# Patient Record
Sex: MALE | Race: WHITE | NOT HISPANIC OR LATINO | Employment: FULL TIME | URBAN - NONMETROPOLITAN AREA
[De-identification: names, ages, dates, MRNs, and addresses within clinical notes are randomized per-mention and may not be internally consistent; named-entity substitution may affect disease eponyms.]

---

## 2021-02-12 ENCOUNTER — TRANSCRIBE ORDERS (OUTPATIENT)
Dept: ADMINISTRATIVE | Facility: HOSPITAL | Age: 26
End: 2021-02-12

## 2021-02-12 DIAGNOSIS — R10.9 ABDOMINAL PAIN, UNSPECIFIED ABDOMINAL LOCATION: Primary | ICD-10-CM

## 2021-02-12 DIAGNOSIS — N50.82 SCROTAL PAIN: ICD-10-CM

## 2021-03-11 ENCOUNTER — APPOINTMENT (OUTPATIENT)
Dept: ULTRASOUND IMAGING | Facility: HOSPITAL | Age: 26
End: 2021-03-11

## 2021-03-11 ENCOUNTER — HOSPITAL ENCOUNTER (OUTPATIENT)
Dept: ULTRASOUND IMAGING | Facility: HOSPITAL | Age: 26
Discharge: HOME | End: 2021-03-11
Admitting: INTERNAL MEDICINE

## 2021-03-11 DIAGNOSIS — N50.82 SCROTAL PAIN: ICD-10-CM

## 2021-03-11 PROCEDURE — 76870 US EXAM SCROTUM: CPT

## 2021-04-22 ENCOUNTER — OFFICE VISIT (OUTPATIENT)
Dept: UROLOGY | Facility: CLINIC | Age: 26
End: 2021-04-22

## 2021-04-22 VITALS
BODY MASS INDEX: 29.02 KG/M2 | DIASTOLIC BLOOD PRESSURE: 70 MMHG | HEART RATE: 70 BPM | OXYGEN SATURATION: 98 % | HEIGHT: 64 IN | WEIGHT: 170 LBS | TEMPERATURE: 97.2 F | RESPIRATION RATE: 16 BRPM | SYSTOLIC BLOOD PRESSURE: 130 MMHG

## 2021-04-22 DIAGNOSIS — N50.82 SCROTAL PAIN: Primary | ICD-10-CM

## 2021-04-22 PROCEDURE — 99203 OFFICE O/P NEW LOW 30 MIN: CPT | Performed by: UROLOGY

## 2021-04-22 RX ORDER — PROPRANOLOL HCL 60 MG
CAPSULE, EXTENDED RELEASE 24HR ORAL
COMMUNITY
End: 2022-03-18

## 2021-04-22 RX ORDER — GUSELKUMAB 100 MG/ML
INJECTION SUBCUTANEOUS
COMMUNITY
Start: 2021-01-13 | End: 2022-03-18

## 2021-04-22 RX ORDER — MELOXICAM 15 MG/1
15 TABLET ORAL DAILY
COMMUNITY
Start: 2021-02-24 | End: 2022-03-18

## 2021-04-22 NOTE — PROGRESS NOTES
Chief Complaint  Chief Complaint   Patient presents with   • Scrotal Pain      Referring Provider:  Anjelica Celis MD    HPI  Mr. Rodriguez is a 26 y.o. male with below past medical history who presents with scrotal pain. He notes that the pain started about 2 years ago, but for a while he did not feel it. His pain returned recently. He describes it as dull unless he puts pressure on his scrotum, and then it is a sharp pain. Activity occasionally makes it worse. The pain is not radiating. The patient denies any burning, pain or blood with urination. He notes tightness following intercourse.    The patient notes that he went to another physician about 6 months ago who told him that it may be epididymitis, but he wasn't sure if she was just guessing. She prescribed some antibiotics that did not help.    Past Medical History  History reviewed. No pertinent past medical history.    Past Surgical History  History reviewed. No pertinent surgical history.    Medications    Current Outpatient Medications:   •  meloxicam (MOBIC) 15 MG tablet, Take 15 mg by mouth Daily., Disp: , Rfl:   •  propranolol LA (INDERAL LA) 60 MG 24 hr capsule, propranolol ER 60 mg capsule,24 hr,extended release  TAKE 1 CAPSULE BY MOUTH EVERY DAY AT BEDTIME, Disp: , Rfl:   •  Tremfya 100 MG/ML solution pen-injector, INJECT 100MG UNDER THE SKIN AT WEEK 0, Disp: , Rfl:     Allergies  No Known Allergies    Social History  Social History     Socioeconomic History   • Marital status:      Spouse name: Not on file   • Number of children: Not on file   • Years of education: Not on file   • Highest education level: Not on file   Tobacco Use   • Smoking status: Never Smoker   • Smokeless tobacco: Never Used   Substance and Sexual Activity   • Alcohol use: Not Currently   • Drug use: Never   • Sexual activity: Defer       Family History  History reviewed. No pertinent family history.    Review of Systems  Review of systems was notable for  "post-ejaculatory pain.    Physical Exam  Visit Vitals  /70   Pulse 70   Temp 97.2 °F (36.2 °C)   Resp 16   Ht 162.6 cm (64\")   Wt 77.1 kg (170 lb)   SpO2 98%   BMI 29.18 kg/m²     Physical exam was performed and was notable for right, very tight and painful levator tenderness with palpation.    Genitourinary  Penis: uncircumcised penis, glans normal, meatus patent, no penile discharge.  No rashes/lesions.    Testes: descended bilaterally, no masses, non-tender to palpation, right is slightly tender to palpation. Remainder of scrotal contents normal. No hernia appreciated.      Labs  No results found for: PSA    No results found for: GLUCOSE, CALCIUM, NA, K, CO2, CL, BUN, CREATININE, EGFRIFAFRI, EGFRIFNONA, BCR, ANIONGAP    No results found for: WBC, HGB, HCT, MCV, PLT    Brief Urine Lab Results     None           Radiologic Studies  US Scrotum & Testicles  Result Date: 3/11/2021  No evidence of testicular mass or torsion.  This report was finalized on 3/11/2021 8:16 AM by Vandana Suarez M.D..      Assessment  Mr. Rodriguez is a 26 y.o. male who presents with testicular pain. I recommended that the patient attend pelvic floor physical therapy at [facility] to help with his symptoms. I also recommended tighter underwear or a jock strap, as well as warm soaks and scheduled ibuprofen 3 times a day for a week for a flare up. There is nothing organically wrong with his testicles that I am able to find on physical exam or imaging.     Plan  1.  Referral for pelvic floor physical therapy at [facility].  2.  Scheduled ibuprofen 3 times a day for flare ups.   3.  Follow up in 6 months.     Scribed for Josh Bustillos MD by Aris Matias.  04/22/21   14:09 EDT    I have personally performed the services described in this document as scribed by the above individual, and it is both accurate and complete.  Josh Bustillos MD  4/26/2021  21:21 EDT      "

## 2022-03-18 ENCOUNTER — OFFICE VISIT (OUTPATIENT)
Dept: INTERNAL MEDICINE | Facility: CLINIC | Age: 27
End: 2022-03-18

## 2022-03-18 VITALS
HEART RATE: 89 BPM | SYSTOLIC BLOOD PRESSURE: 122 MMHG | TEMPERATURE: 98.4 F | DIASTOLIC BLOOD PRESSURE: 70 MMHG | WEIGHT: 165.12 LBS | HEIGHT: 65 IN | BODY MASS INDEX: 27.51 KG/M2 | OXYGEN SATURATION: 99 %

## 2022-03-18 DIAGNOSIS — Z00.00 ROUTINE GENERAL MEDICAL EXAMINATION AT A HEALTH CARE FACILITY: ICD-10-CM

## 2022-03-18 DIAGNOSIS — L29.0 RECTAL ITCHING: Primary | ICD-10-CM

## 2022-03-18 DIAGNOSIS — E78.2 MIXED HYPERLIPIDEMIA: ICD-10-CM

## 2022-03-18 PROCEDURE — 99385 PREV VISIT NEW AGE 18-39: CPT | Performed by: INTERNAL MEDICINE

## 2022-03-18 NOTE — PROGRESS NOTES
Chief Complaint   Patient presents with   • Hernia     New patient - hernia concerns (pain with lifting weights). Testicle pain / sensitive. Skin concerns (rectal)Trouble falling asleep. Fasting today for labs, elevated cholesterol and liver enzymes on last labs (over a year ago)       CAILIN Irziarry is a 27 y.o. male and is here for a comprehensive physical exam. The patient reports problems - rectal itching.     History:  Erectile dysfunction  no  Nocturia  no      Do you take any herbs or supplements that were not prescribed by a doctor? no    Are you taking aspirin daily? no      Health Habits:  Dental Exam. unknown  Eye Exam. up to date  Exercise: 4 times/week.  Current exercise activities include: cardiovascular workout on exercise equipment and weightlifting    Health Maintenance   Topic Date Due   • ANNUAL PHYSICAL  Never done   • TDAP/TD VACCINES (1 - Tdap) Never done   • HEPATITIS C SCREENING  Never done   • COVID-19 Vaccine (3 - Booster for Moderna series) 09/27/2021   • LIPID PANEL  Never done   • INFLUENZA VACCINE  03/31/2022 (Originally 8/1/2021)   • Pneumococcal Vaccine 0-64  Aged Out       PMH, PSH, SocHx, FamHx, Allergies, and Medications: Reviewed and updated in the Visit Navigator.     No Known Allergies  Past Medical History:   Diagnosis Date   • Elevated liver enzymes    • Psoriasis      No past surgical history on file.  Social History     Socioeconomic History   • Marital status:    Tobacco Use   • Smoking status: Never Smoker   • Smokeless tobacco: Never Used   Vaping Use   • Vaping Use: Never used   Substance and Sexual Activity   • Alcohol use: Not Currently     Comment: Social   • Drug use: Never   • Sexual activity: Defer     Family History   Problem Relation Age of Onset   • No Known Problems Mother    • No Known Problems Father    • Cancer Maternal Grandfather        Review of Systems  Review of Systems   Constitutional: Negative.  Negative for activity change, appetite  "change, fatigue and fever.   HENT: Negative for congestion, ear discharge, ear pain and trouble swallowing.    Eyes: Negative for photophobia and visual disturbance.   Respiratory: Negative for cough and shortness of breath.    Cardiovascular: Negative for chest pain and palpitations.   Gastrointestinal: Negative for abdominal distention, abdominal pain, constipation, diarrhea, nausea and vomiting.   Endocrine: Negative.    Genitourinary: Negative for dysuria, hematuria and urgency.   Musculoskeletal: Negative for arthralgias, back pain, joint swelling and myalgias.   Skin: Negative for color change and rash.   Allergic/Immunologic: Negative.    Neurological: Negative for dizziness, weakness, light-headedness and headaches.   Hematological: Negative for adenopathy. Does not bruise/bleed easily.   Psychiatric/Behavioral: Positive for sleep disturbance. Negative for agitation, confusion and dysphoric mood. The patient is not nervous/anxious.        Vitals:    03/18/22 0948   BP: 122/70   Pulse: 89   Temp: 98.4 °F (36.9 °C)   SpO2: 99%       Objective   /70   Pulse 89   Temp 98.4 °F (36.9 °C) (Infrared)   Ht 165.1 cm (65\")   Wt 74.9 kg (165 lb 1.9 oz)   SpO2 99%   BMI 27.48 kg/m²     Physical Exam  Constitutional:       General: He is not in acute distress.     Appearance: He is well-developed.   HENT:      Nose: Nose normal.   Eyes:      General: No scleral icterus.     Conjunctiva/sclera: Conjunctivae normal.   Neck:      Thyroid: No thyromegaly.      Trachea: No tracheal deviation.   Cardiovascular:      Rate and Rhythm: Normal rate and regular rhythm.      Heart sounds: No murmur heard.    No friction rub.   Pulmonary:      Effort: No respiratory distress.      Breath sounds: No wheezing or rales.   Abdominal:      General: There is no distension.      Palpations: Abdomen is soft. There is no mass.      Tenderness: There is no abdominal tenderness. There is no guarding.   Genitourinary:     Rectum: " Normal.   Musculoskeletal:         General: No deformity. Normal range of motion.   Lymphadenopathy:      Cervical: No cervical adenopathy.   Skin:     General: Skin is warm and dry.      Findings: No erythema or rash.   Neurological:      Mental Status: He is alert and oriented to person, place, and time.      Cranial Nerves: No cranial nerve deficit.      Coordination: Coordination normal.      Deep Tendon Reflexes: Reflexes are normal and symmetric.   Psychiatric:         Behavior: Behavior normal.         Thought Content: Thought content normal.         Judgment: Judgment normal.         The CVD Risk score (SIRENA'Agostino, et al., 2008) failed to calculate for the following reasons:    The 2008 CVD risk score is only valid for ages 30 to 74    No results found for: CHOL, CHLPL, TRIG, HDL, LDL, LDLDIRECT  No results found for: GLUCOSE, BUN, CREATININE, NA, K, CL, CO2, CALCIUM, PROTEINTOT, ALBUMIN, ALT, AST, ALKPHOS, BILITOT, EGFRIFNONA, LABIL2, BCR, ANIONGAP  No results found for: WBC, HGB, HCT, MCV, PLT    Assessment/Plan   1. Healthy male exam.   2. Patient Counseling: Including but not Limited to the following, when appropriate:  --Nutrition: Stressed importance of moderation in sodium/caffeine intake, saturated fat and cholesterol, caloric balance, sufficient intake of fresh fruits, vegetables, fiber    --Exercise: Stressed the importance of regular exercise.   --Substance Abuse: Discussed cessation/primary prevention of tobacco, alcohol, or other drug use; driving or other dangerous activities under the influence; availability of treatment for abuse, as indicated based on social history.    --Sexuality: Discussed sexually transmitted diseases, partner selection, use of condoms and contraceptive alternatives.   --Injury prevention: Discussed safety belts, safety helmets, smoke detector, smoking near bedding or upholstery.   --Dental health: Discussed importance of regular tooth brushing, flossing, and dental  visits.  --Immunizations reviewed.        3. Discussed the patient's BMI with him.  The BMI is in the acceptable range  4. No follow-ups on file.  5. Age-appropriate Screening Scheduled  6. There are no Patient Instructions on file for this visit.    Assessment/Plan     Diagnoses and all orders for this visit:    1. Rectal itching (Primary) exam unremarkable.  Trial of Preparation H denies constipation    2. Routine general medical examination at a health care facility  -     Lipid Panel  -     Comprehensive Metabolic Panel  -     Vitamin D 25 Hydroxy  -     CBC (No Diff)    3. Mixed hyperlipidemia noted to have elevated lipids in the past discussed diet check labs

## 2022-03-19 LAB
25(OH)D3+25(OH)D2 SERPL-MCNC: 8.1 NG/ML (ref 30–100)
ALBUMIN SERPL-MCNC: 5 G/DL (ref 3.5–5.2)
ALBUMIN/GLOB SERPL: 2.1 G/DL
ALP SERPL-CCNC: 85 U/L (ref 39–117)
ALT SERPL-CCNC: 64 U/L (ref 1–41)
AST SERPL-CCNC: 24 U/L (ref 1–40)
BILIRUB SERPL-MCNC: 0.3 MG/DL (ref 0–1.2)
BUN SERPL-MCNC: 12 MG/DL (ref 6–20)
BUN/CREAT SERPL: 13.5 (ref 7–25)
CALCIUM SERPL-MCNC: 9.5 MG/DL (ref 8.6–10.5)
CHLORIDE SERPL-SCNC: 104 MMOL/L (ref 98–107)
CHOLEST SERPL-MCNC: 252 MG/DL (ref 0–200)
CO2 SERPL-SCNC: 24.8 MMOL/L (ref 22–29)
CREAT SERPL-MCNC: 0.89 MG/DL (ref 0.76–1.27)
EGFRCR SERPLBLD CKD-EPI 2021: 121.2 ML/MIN/1.73
ERYTHROCYTE [DISTWIDTH] IN BLOOD BY AUTOMATED COUNT: 12.4 % (ref 12.3–15.4)
GLOBULIN SER CALC-MCNC: 2.4 GM/DL
GLUCOSE SERPL-MCNC: 98 MG/DL (ref 65–99)
HCT VFR BLD AUTO: 45.3 % (ref 37.5–51)
HCV AB S/CO SERPL IA: 0.1 S/CO RATIO (ref 0–0.9)
HDLC SERPL-MCNC: 40 MG/DL (ref 40–60)
HGB BLD-MCNC: 15.1 G/DL (ref 13–17.7)
LDLC SERPL CALC-MCNC: 185 MG/DL (ref 0–100)
MCH RBC QN AUTO: 30.3 PG (ref 26.6–33)
MCHC RBC AUTO-ENTMCNC: 33.3 G/DL (ref 31.5–35.7)
MCV RBC AUTO: 91 FL (ref 79–97)
PLATELET # BLD AUTO: 320 10*3/MM3 (ref 140–450)
POTASSIUM SERPL-SCNC: 4.9 MMOL/L (ref 3.5–5.2)
PROT SERPL-MCNC: 7.4 G/DL (ref 6–8.5)
RBC # BLD AUTO: 4.98 10*6/MM3 (ref 4.14–5.8)
SODIUM SERPL-SCNC: 141 MMOL/L (ref 136–145)
TRIGL SERPL-MCNC: 144 MG/DL (ref 0–150)
VLDLC SERPL CALC-MCNC: 27 MG/DL (ref 5–40)
WBC # BLD AUTO: 6.73 10*3/MM3 (ref 3.4–10.8)

## 2022-03-22 ENCOUNTER — TELEPHONE (OUTPATIENT)
Dept: INTERNAL MEDICINE | Facility: CLINIC | Age: 27
End: 2022-03-22

## 2022-03-22 NOTE — TELEPHONE ENCOUNTER
Called and informed patient that I do not see that anyone tried contacting him. Informed patient I do see that labs are back but Dr. Lorenz is out of the office until Thursday.

## 2022-03-22 NOTE — TELEPHONE ENCOUNTER
Patient called and states he was returning a call to get his lab results. He had a missed call yesterday and would like a call back today if possible. Call him at [phone]

## 2022-03-24 NOTE — TELEPHONE ENCOUNTER
I was not able to get hold of him on 3 different occasions.  Could not leave a message on his cell phone.  Left message now and discussed with wife advised to start vitamin D3 2000 units daily with food.  Discussed dietary restrictions.

## 2022-12-09 ENCOUNTER — HOSPITAL ENCOUNTER (OUTPATIENT)
Dept: GENERAL RADIOLOGY | Facility: HOSPITAL | Age: 27
Discharge: HOME | End: 2022-12-09
Admitting: NURSE PRACTITIONER

## 2022-12-09 ENCOUNTER — OFFICE VISIT (OUTPATIENT)
Dept: INTERNAL MEDICINE | Facility: CLINIC | Age: 27
End: 2022-12-09

## 2022-12-09 VITALS
TEMPERATURE: 97.7 F | HEIGHT: 65 IN | OXYGEN SATURATION: 100 % | HEART RATE: 77 BPM | DIASTOLIC BLOOD PRESSURE: 88 MMHG | BODY MASS INDEX: 29.16 KG/M2 | SYSTOLIC BLOOD PRESSURE: 128 MMHG | WEIGHT: 175 LBS

## 2022-12-09 DIAGNOSIS — M79.89 SWELLING OF RIGHT MIDDLE FINGER: Primary | ICD-10-CM

## 2022-12-09 DIAGNOSIS — M79.644 PAIN OF RIGHT MIDDLE FINGER: ICD-10-CM

## 2022-12-09 PROBLEM — L40.9 PSORIASIS: Status: ACTIVE | Noted: 2022-12-09

## 2022-12-09 PROCEDURE — 73140 X-RAY EXAM OF FINGER(S): CPT

## 2022-12-09 PROCEDURE — 99213 OFFICE O/P EST LOW 20 MIN: CPT | Performed by: NURSE PRACTITIONER

## 2022-12-09 RX ORDER — IBUPROFEN 800 MG/1
800 TABLET ORAL 2 TIMES DAILY PRN
Qty: 28 TABLET | Refills: 0 | Status: SHIPPED | OUTPATIENT
Start: 2022-12-09

## 2022-12-09 NOTE — PROGRESS NOTES
"  Office Visit      Patient Name: CAILIN Irizarry  : 1995   MRN: [id number]   Care Team: Patient Care Team:  Bryan Lorenz MD as PCP - General (Internal Medicine)    Chief Complaint  Finger Injury (Right hand middle finger)    Subjective     Subjective      CAILIN Irizarry presents to [facility] for right middle finger swelling.   Symptoms started about 1.5 weeks ago.   He thinks he may have hurt himself on a weighted plate but he is really unsure if this is what caused the swelling.   Endorses erythema, swelling of the middle finger, and mild pain.   Denies warmth, bilateral symptoms, ETOH use, history of gout, fever, and pruritis.  Ha improved some, symptoms are somewhat waxing and waning. Has noticed   He has tried ice but didn't do much   History of psoriasis, has not tried anything for the symptoms.   Objective     Objective   Vital Signs:   /88   Pulse 77   Temp 97.7 °F (36.5 °C)   Ht 165.1 cm (65\")   Wt 79.4 kg (175 lb)   SpO2 100%   BMI 29.12 kg/m²     Physical Exam  Vitals and nursing note reviewed.   Constitutional:       Appearance: Normal appearance.   Musculoskeletal:         General: Normal range of motion.      Comments: Soft tissue swelling with mild erythema or right middle finger- ROM intact     Skin:     General: Skin is warm and dry.      Findings: No rash.   Neurological:      Mental Status: He is alert.   Psychiatric:         Mood and Affect: Mood normal.         Behavior: Behavior normal.          Assessment / Plan      Assessment & Plan   Problem List Items Addressed This Visit    None  Visit Diagnoses     Swelling of right middle finger    -  Primary    Relevant Orders    XR finger 2+ vw right    Uric acid    Pain of right middle finger        Relevant Orders    XR finger 2+ vw right    Uric acid    Unclear etiology. Check uric acid and will perform x-ray imaging to rule out acute fracture due to possible injury. Recommend NSAIDs " and ice. Can try steroid cream for possible psoriatic rash which he does have a history of. Follow-up as needed if not improving.            Follow Up   Return if symptoms worsen or fail to improve.  Patient was given instructions and counseling regarding his condition or for health maintenance advice. Please see specific information pulled into the AVS if appropriate.     KRISTEN Reich  [facility]

## 2022-12-10 LAB — URATE SERPL-MCNC: 5.1 MG/DL (ref 3.8–8.4)

## 2024-02-07 NOTE — PROGRESS NOTES
Patient: ANJEL Irizarry    YOB: 1995    Date: 02/09/2024    Primary Care Provider: Anjelica Celis MD    Chief Complaint   Patient presents with    Abdominal Pain       SUBJECTIVE:    History of present illness:  The patient is in the office today for evaluation and treatment of abdominal pain.  He had an ultrasound that showed adenomyomatosis of the gallbladder.  He states he has RUQ pain that radiates to his LUQ for 2 months. Denies nausea and vomiting. States he has occasional diarrhea. He states certain cheeses mess with his stomach.  Patient concerned about positive lactose intolerance, he had a gluten allergy test that was negative.  He has significant right upper quadrant pain that radiates to his back and right shoulder.  Significant intolerance to cheeses.  No significant heartburn symptoms or reflux.  No problem with spicy foods.    The following portions of the patient's history were reviewed and updated as appropriate: allergies, current medications, past family history, past medical history, past social history, past surgical history and problem list.      Review of Systems   Constitutional:  Negative for chills, fever and unexpected weight change.   HENT:  Negative for trouble swallowing and voice change.    Eyes:  Negative for visual disturbance.   Respiratory:  Negative for apnea, cough, chest tightness, shortness of breath and wheezing.    Cardiovascular:  Negative for chest pain, palpitations and leg swelling.   Gastrointestinal:  Positive for abdominal pain. Negative for abdominal distention, anal bleeding, blood in stool, constipation, diarrhea, nausea, rectal pain and vomiting.   Endocrine: Negative for cold intolerance and heat intolerance.   Genitourinary:  Negative for difficulty urinating, dysuria, flank pain, scrotal swelling and testicular pain.   Musculoskeletal:  Negative for back pain, gait problem and joint swelling.   Skin:  Negative for color change,  rash and wound.   Neurological:  Negative for dizziness, syncope, speech difficulty, weakness, numbness and headaches.   Hematological:  Negative for adenopathy. Does not bruise/bleed easily.   Psychiatric/Behavioral:  Negative for confusion. The patient is not nervous/anxious.        Allergies:  No Known Allergies    Medications:    Current Outpatient Medications:     betamethasone, augmented, (DIPROLENE) 0.05 % cream, APPLY A THIN LAYER TO THE AFFECTED AREA 1-2 TIMES DAILY FOR PSORIASIS ON BODY, Disp: , Rfl:     desonide (DESOWEN) 0.05 % cream, APPLY SPARINGLY AND RUB GENTLY INTO THE AFFECTED AREAS OF FACIAL PSORIASIS TWICE DAILY FOR 2 WEEKS THEN STOP FOR 1 WEEK. REPEAT CYCLE AS NEE, Disp: , Rfl:     ibuprofen (ADVIL,MOTRIN) 800 MG tablet, Take 1 tablet by mouth 2 (Two) Times a Day As Needed for Mild Pain or Moderate Pain., Disp: 28 tablet, Rfl: 0    imiquimod (ALDARA) 5 % cream, USE ONCE DAILY UNTIL NO LONGER RED THEN STOP CAN RESTART ON NEW LESIONS ONCE HEALED, Disp: , Rfl:     tacrolimus (PROTOPIC) 0.1 % ointment, APPLY TOPICALLY TO RASH DAILY AS DIRECTED, Disp: , Rfl:     topiramate (TOPAMAX) 25 MG tablet, Take 1 tablet every day by oral route at bedtime for 30 days., Disp: , Rfl:     Vtama 1 % cream, , Disp: , Rfl:     Zoryve 0.3 % cream, , Disp: , Rfl:     History:  Past Medical History:   Diagnosis Date    Elevated liver enzymes     Psoriasis        No past surgical history on file.    Family History   Problem Relation Age of Onset    No Known Problems Mother     No Known Problems Father     Cancer Maternal Grandfather        Social History     Tobacco Use    Smoking status: Never    Smokeless tobacco: Never   Vaping Use    Vaping Use: Never used   Substance Use Topics    Alcohol use: Not Currently     Comment: Social    Drug use: Never        OBJECTIVE:    Vital Signs:   Vitals:    02/09/24 1358   BP: 120/72   Pulse: 65   Temp: 97.8 °F (36.6 °C)   SpO2: 100%   Weight: 79.8 kg (176 lb)   Height: 162.6 cm  "(64\")       Physical Exam:   General Appearance:    Alert, cooperative, in no acute distress   Head:    Normocephalic, without obvious abnormality, atraumatic   Eyes:            Lids and lashes normal, conjunctivae and sclerae normal, no   icterus, no pallor, corneas clear, PERRLA   Ears:    Ears appear intact with no abnormalities noted   Throat:   No oral lesions, no thrush, oral mucosa moist   Neck:   No adenopathy, supple, trachea midline, no thyromegaly, no   carotid bruit, no JVD   Lungs:     Clear to auscultation,respirations regular, even and                  unlabored    Heart:    Regular rhythm and normal rate, normal S1 and S2, no            murmur, no gallop, no rub, no click   Chest Wall:    No abnormalities observed   Abdomen:     Normal bowel sounds, no masses, no organomegaly, soft and tender right upper quadrant with guarding and rebound.  No abdominal wall hernias or masses.   Extremities:   Moves all extremities well, no edema, no cyanosis, no             redness   Pulses:   Pulses palpable and equal bilaterally   Skin:   No bleeding, bruising or rash   Lymph nodes:   No palpable adenopathy   Neurologic:   Cranial nerves 2 - 12 grossly intact, sensation intact, DTR       present and equal bilaterally     Results Review:   I reviewed the patient's new clinical results.    Review of Systems was reviewed and confirmed as accurate as documented by the MA.    ASSESSMENT/PLAN:    1. Right upper quadrant abdominal pain    2. Fatty liver    3. Elevated liver enzymes        Patient placed on low-fat diet and told avoid milk products.  Also scheduled for HIDA scan evaluate elevated liver enzymes, fatty liver and right upper quadrant pain.  Patient agreeable to plan and wishes to proceed.  He had no further questions.    I discussed the patients findings and my recommendations with patient        Electronically signed by Osmin Webb MD  02/09/24          "

## 2024-02-09 ENCOUNTER — OFFICE VISIT (OUTPATIENT)
Dept: SURGERY | Facility: CLINIC | Age: 29
End: 2024-02-09
Payer: COMMERCIAL

## 2024-02-09 VITALS
OXYGEN SATURATION: 100 % | SYSTOLIC BLOOD PRESSURE: 120 MMHG | HEIGHT: 64 IN | WEIGHT: 176 LBS | HEART RATE: 65 BPM | BODY MASS INDEX: 30.05 KG/M2 | TEMPERATURE: 97.8 F | DIASTOLIC BLOOD PRESSURE: 72 MMHG

## 2024-02-09 DIAGNOSIS — R10.11 RIGHT UPPER QUADRANT ABDOMINAL PAIN: Primary | ICD-10-CM

## 2024-02-09 DIAGNOSIS — K76.0 FATTY LIVER: ICD-10-CM

## 2024-02-09 DIAGNOSIS — R74.8 ELEVATED LIVER ENZYMES: ICD-10-CM

## 2024-02-09 PROCEDURE — 99204 OFFICE O/P NEW MOD 45 MIN: CPT | Performed by: SURGERY

## 2024-02-09 RX ORDER — IMIQUIMOD 12.5 MG/.25G
CREAM TOPICAL
COMMUNITY

## 2024-02-09 RX ORDER — TAPINAROF 10 MG/1000MG
CREAM TOPICAL
COMMUNITY

## 2024-02-09 RX ORDER — BETAMETHASONE DIPROPIONATE 0.5 MG/G
CREAM TOPICAL
COMMUNITY

## 2024-02-09 RX ORDER — ROFLUMILAST 3 MG/G
CREAM TOPICAL
COMMUNITY

## 2024-02-09 RX ORDER — DESONIDE 0.5 MG/G
CREAM TOPICAL
COMMUNITY

## 2024-02-09 RX ORDER — TACROLIMUS 1 MG/G
OINTMENT TOPICAL
COMMUNITY

## 2024-02-09 RX ORDER — TOPIRAMATE 25 MG/1
TABLET ORAL
COMMUNITY

## 2024-02-20 ENCOUNTER — HOSPITAL ENCOUNTER (OUTPATIENT)
Dept: NUCLEAR MEDICINE | Facility: HOSPITAL | Age: 29
Discharge: HOME | End: 2024-02-20
Payer: COMMERCIAL

## 2024-02-20 DIAGNOSIS — K76.0 FATTY LIVER: ICD-10-CM

## 2024-02-20 DIAGNOSIS — R74.8 ELEVATED LIVER ENZYMES: ICD-10-CM

## 2024-02-20 DIAGNOSIS — R10.11 RIGHT UPPER QUADRANT ABDOMINAL PAIN: ICD-10-CM

## 2024-02-20 PROCEDURE — 25010000002 SINCALIDE PER 5 MCG: Performed by: SURGERY

## 2024-02-20 PROCEDURE — A9537 TC99M MEBROFENIN: HCPCS | Performed by: SURGERY

## 2024-02-20 PROCEDURE — 78227 HEPATOBIL SYST IMAGE W/DRUG: CPT

## 2024-02-20 PROCEDURE — 0 TECHNETIUM TC 99M MEBROFENIN KIT: Performed by: SURGERY

## 2024-02-20 RX ORDER — KIT FOR THE PREPARATION OF TECHNETIUM TC 99M MEBROFENIN 45 MG/10ML
1 INJECTION, POWDER, LYOPHILIZED, FOR SOLUTION INTRAVENOUS
Status: COMPLETED | OUTPATIENT
Start: 2024-02-20 | End: 2024-02-20

## 2024-02-20 RX ORDER — SINCALIDE 5 UG/5ML
0.02 INJECTION, POWDER, LYOPHILIZED, FOR SOLUTION INTRAVENOUS
Status: COMPLETED | OUTPATIENT
Start: 2024-02-20 | End: 2024-02-20

## 2024-02-20 RX ADMIN — SINCALIDE 1.6 MCG: 5 INJECTION, POWDER, LYOPHILIZED, FOR SOLUTION INTRAVENOUS at 16:05

## 2024-02-20 RX ADMIN — MEBROFENIN 1 DOSE: 45 INJECTION, POWDER, LYOPHILIZED, FOR SOLUTION INTRAVENOUS at 14:40

## 2024-02-21 NOTE — PROGRESS NOTES
Patient: ANJEL Irizarry  YOB: 1995    Date: 02/23/2024    Primary Care Provider: Anjelica Celis MD    Chief Complaint   Patient presents with    Follow-up     HIDA scan        History: The patient was seen today for his follow up from his recent hida scan.  The ejection fraction did show 36%.  Patient has been doing better with a lactose-free diet.  Minimal nausea with Kinevac injection.  Patient also had gallbladder wall thickening and cholesterol stones on ultrasound.    The following portions of the patient's history were reviewed and updated as appropriate: allergies, current medications, past family history, past medical history, past social history, past surgical history and problem list.    Review of Systems   Constitutional:  Negative for chills, fever and unexpected weight change.   HENT:  Negative for trouble swallowing and voice change.    Eyes:  Negative for visual disturbance.   Respiratory:  Negative for apnea, cough, chest tightness, shortness of breath and wheezing.    Cardiovascular:  Negative for chest pain, palpitations and leg swelling.   Gastrointestinal:  Negative for abdominal distention, abdominal pain, anal bleeding, blood in stool, constipation, diarrhea, nausea, rectal pain and vomiting.   Endocrine: Negative for cold intolerance and heat intolerance.   Genitourinary:  Negative for difficulty urinating, dysuria, flank pain, scrotal swelling and testicular pain.   Musculoskeletal:  Negative for back pain, gait problem and joint swelling.   Skin:  Negative for color change, rash and wound.   Neurological:  Negative for dizziness, syncope, speech difficulty, weakness, numbness and headaches.   Hematological:  Negative for adenopathy. Does not bruise/bleed easily.   Psychiatric/Behavioral:  Negative for confusion. The patient is not nervous/anxious.        Vital Signs  Vitals:    02/23/24 1413   BP: 132/90   Pulse: 77   Temp: 97.7 °F (36.5 °C)   SpO2: 100%  "  Weight: 79.8 kg (176 lb)   Height: 162.6 cm (64.02\")       Allergies:  No Known Allergies    Medications:    Current Outpatient Medications:     betamethasone, augmented, (DIPROLENE) 0.05 % cream, APPLY A THIN LAYER TO THE AFFECTED AREA 1-2 TIMES DAILY FOR PSORIASIS ON BODY, Disp: , Rfl:     desonide (DESOWEN) 0.05 % cream, APPLY SPARINGLY AND RUB GENTLY INTO THE AFFECTED AREAS OF FACIAL PSORIASIS TWICE DAILY FOR 2 WEEKS THEN STOP FOR 1 WEEK. REPEAT CYCLE AS NEE, Disp: , Rfl:     ibuprofen (ADVIL,MOTRIN) 800 MG tablet, Take 1 tablet by mouth 2 (Two) Times a Day As Needed for Mild Pain or Moderate Pain., Disp: 28 tablet, Rfl: 0    imiquimod (ALDARA) 5 % cream, USE ONCE DAILY UNTIL NO LONGER RED THEN STOP CAN RESTART ON NEW LESIONS ONCE HEALED, Disp: , Rfl:     tacrolimus (PROTOPIC) 0.1 % ointment, APPLY TOPICALLY TO RASH DAILY AS DIRECTED, Disp: , Rfl:     topiramate (TOPAMAX) 25 MG tablet, Take 1 tablet every day by oral route at bedtime for 30 days., Disp: , Rfl:     Vtama 1 % cream, , Disp: , Rfl:     Zoryve 0.3 % cream, , Disp: , Rfl:     Physical Exam:   General Appearance:    Alert, cooperative, in no acute distress   Head:    Normocephalic, without obvious abnormality, atraumatic   Lungs:     Clear to auscultation,respirations regular, even and                  unlabored    Heart:    Regular rhythm and normal rate, normal S1 and S2, no            murmur, no gallop, no rub, no click   Abdomen:     Normal bowel sounds, no masses, no organomegaly, soft        non-tender, non-distended, mildly tender right upper quadrant.  No abdominal wall hernias or masses.   Extremities:   Moves all extremities well, no edema, no cyanosis, no             redness   Pulses:   Pulses palpable and equal bilaterally   Skin:   No bleeding, bruising or rash     Results Review:   I reviewed the patient's new clinical results.     Review of Systems was reviewed and confirmed as accurate as documented by the " MA.    ASSESSMENT/PLAN:    1. Biliary dyskinesia    2. Right upper quadrant abdominal pain       Patient would like to try a lactose-free diet and low-fat diet in interim.  If symptoms become worse or continued he will follow-up for me to schedule laparoscopic cholecystectomy.  Risk of bleeding infection possibility of bile leak and bile duct injury discussed and patient agreeable.  He had no further questions.    Electronically signed by Osmin Webb MD  02/23/24

## 2024-02-23 ENCOUNTER — OFFICE VISIT (OUTPATIENT)
Dept: SURGERY | Facility: CLINIC | Age: 29
End: 2024-02-23
Payer: COMMERCIAL

## 2024-02-23 VITALS
BODY MASS INDEX: 30.05 KG/M2 | SYSTOLIC BLOOD PRESSURE: 132 MMHG | HEART RATE: 77 BPM | TEMPERATURE: 97.7 F | OXYGEN SATURATION: 100 % | WEIGHT: 176 LBS | HEIGHT: 64 IN | DIASTOLIC BLOOD PRESSURE: 90 MMHG

## 2024-02-23 DIAGNOSIS — R10.11 RIGHT UPPER QUADRANT ABDOMINAL PAIN: ICD-10-CM

## 2024-02-23 DIAGNOSIS — K82.8 BILIARY DYSKINESIA: Primary | ICD-10-CM

## 2024-02-23 PROCEDURE — 99213 OFFICE O/P EST LOW 20 MIN: CPT | Performed by: SURGERY
